# Patient Record
Sex: FEMALE | Race: ASIAN | NOT HISPANIC OR LATINO
[De-identification: names, ages, dates, MRNs, and addresses within clinical notes are randomized per-mention and may not be internally consistent; named-entity substitution may affect disease eponyms.]

---

## 2022-11-02 PROBLEM — Z00.00 ENCOUNTER FOR PREVENTIVE HEALTH EXAMINATION: Status: ACTIVE | Noted: 2022-11-02

## 2022-11-08 ENCOUNTER — APPOINTMENT (OUTPATIENT)
Dept: OTOLARYNGOLOGY | Facility: CLINIC | Age: 62
End: 2022-11-08

## 2022-11-08 ENCOUNTER — NON-APPOINTMENT (OUTPATIENT)
Age: 62
End: 2022-11-08

## 2022-11-08 VITALS
SYSTOLIC BLOOD PRESSURE: 126 MMHG | DIASTOLIC BLOOD PRESSURE: 79 MMHG | HEART RATE: 56 BPM | HEIGHT: 60 IN | WEIGHT: 160 LBS | BODY MASS INDEX: 31.41 KG/M2

## 2022-11-08 DIAGNOSIS — H90.3 SENSORINEURAL HEARING LOSS, BILATERAL: ICD-10-CM

## 2022-11-08 DIAGNOSIS — Z78.9 OTHER SPECIFIED HEALTH STATUS: ICD-10-CM

## 2022-11-08 DIAGNOSIS — R42 DIZZINESS AND GIDDINESS: ICD-10-CM

## 2022-11-08 PROCEDURE — 92557 COMPREHENSIVE HEARING TEST: CPT

## 2022-11-08 PROCEDURE — 92567 TYMPANOMETRY: CPT

## 2022-11-08 PROCEDURE — 99243 OFF/OP CNSLTJ NEW/EST LOW 30: CPT

## 2022-11-08 RX ORDER — AMOXICILLIN 500 MG/1
500 CAPSULE ORAL
Qty: 30 | Refills: 0 | Status: ACTIVE | COMMUNITY
Start: 2022-06-25

## 2022-11-08 RX ORDER — MECLIZINE HYDROCHLORIDE 25 MG/1
25 TABLET ORAL
Qty: 30 | Refills: 0 | Status: ACTIVE | COMMUNITY
Start: 2022-10-17

## 2022-11-08 RX ORDER — METOPROLOL SUCCINATE 25 MG/1
25 TABLET, EXTENDED RELEASE ORAL
Qty: 30 | Refills: 0 | Status: ACTIVE | COMMUNITY
Start: 2022-10-16

## 2022-11-08 RX ORDER — ASPIRIN 81 MG/1
81 TABLET ORAL
Qty: 30 | Refills: 0 | Status: ACTIVE | COMMUNITY
Start: 2022-10-16

## 2022-11-08 RX ORDER — IBUPROFEN 600 MG/1
600 TABLET, FILM COATED ORAL
Qty: 30 | Refills: 0 | Status: ACTIVE | COMMUNITY
Start: 2022-06-25

## 2022-11-08 RX ORDER — LEVOTHYROXINE SODIUM 75 UG/1
75 TABLET ORAL
Qty: 30 | Refills: 0 | Status: ACTIVE | COMMUNITY
Start: 2022-10-16

## 2022-11-08 RX ORDER — COVID-19 ANTIGEN TEST
KIT MISCELLANEOUS
Qty: 1 | Refills: 0 | Status: ACTIVE | COMMUNITY
Start: 2022-07-10

## 2022-11-08 RX ORDER — ATORVASTATIN CALCIUM 20 MG/1
20 TABLET, FILM COATED ORAL
Qty: 30 | Refills: 0 | Status: ACTIVE | COMMUNITY
Start: 2022-09-16

## 2022-11-08 RX ORDER — ASCORBIC ACID 500 MG
TABLET ORAL
Refills: 0 | Status: ACTIVE | COMMUNITY

## 2022-11-08 RX ORDER — CHROMIUM 200 MCG
TABLET ORAL
Refills: 0 | Status: ACTIVE | COMMUNITY

## 2022-11-08 NOTE — HISTORY OF PRESENT ILLNESS
[de-identified] : Sparkle Ortez is a 61 yo female referred by Dr. Tompkins for evaluation of vertigo. On 10/17/22, she developed an episode of vertigo. She denies preceding URI or trauma. She is unsure if a positional change caused this. She notes that it would stop when she stayed still. She notes that getting up would exacerbate it. She went to [facility] MD who referred her to ENT. She did not get imaging. She has not had any significant episodes since then. She did have some residual dysequilibrium. She denies weakness or numbness of extremities. She denies facial weakness or numbness. She denies otalgia, otorrhea, tinnitus, or hearing change. She denies history of recurrent ear infections. She denies fevers or chills.

## 2022-11-08 NOTE — DATA REVIEWED
[de-identified] : type A tymps AU\par AD: hearing WNL to a moderate SNHL 250-8000 Hz\par AS: hearing WNL to a mild SNHL 250-8000 Hz

## 2022-11-08 NOTE — ASSESSMENT
[FreeTextEntry1] : Sparkle Ortez presents for evaluation of vertigo. It is unclear if this was positional in nature. Otoscopic exam was normal. Audiogram was performed and reviewed, showing type A tymps AU, normal to moderate SNHL AD, and normal to mild SNHL AS. Given this asymmetry and history of vertigo, will obtain MRI brain/IAC to to evaluate for retrocochlear pathology.\par \par - MRI brain/IAC\par - Follow up after MRI

## 2022-11-15 ENCOUNTER — RESULT REVIEW (OUTPATIENT)
Age: 62
End: 2022-11-15

## 2022-12-03 ENCOUNTER — APPOINTMENT (OUTPATIENT)
Dept: MRI IMAGING | Facility: CLINIC | Age: 62
End: 2022-12-03

## 2022-12-03 ENCOUNTER — OUTPATIENT (OUTPATIENT)
Dept: OUTPATIENT SERVICES | Facility: HOSPITAL | Age: 62
LOS: 1 days | End: 2022-12-03
Payer: MEDICAID

## 2022-12-03 DIAGNOSIS — R42 DIZZINESS AND GIDDINESS: ICD-10-CM

## 2022-12-03 PROCEDURE — 70553 MRI BRAIN STEM W/O & W/DYE: CPT | Mod: 26,76

## 2022-12-03 PROCEDURE — 70553 MRI BRAIN STEM W/O & W/DYE: CPT

## 2022-12-03 PROCEDURE — A9585: CPT

## 2022-12-04 ENCOUNTER — TRANSCRIPTION ENCOUNTER (OUTPATIENT)
Age: 62
End: 2022-12-04

## 2023-06-21 ENCOUNTER — APPOINTMENT (OUTPATIENT)
Dept: PHARMACY | Facility: CLINIC | Age: 63
End: 2023-06-21